# Patient Record
Sex: FEMALE | URBAN - METROPOLITAN AREA
[De-identification: names, ages, dates, MRNs, and addresses within clinical notes are randomized per-mention and may not be internally consistent; named-entity substitution may affect disease eponyms.]

---

## 2022-08-04 ENCOUNTER — APPOINTMENT (OUTPATIENT)
Dept: URBAN - METROPOLITAN AREA CLINIC 265 | Age: 32
Setting detail: DERMATOLOGY
End: 2022-08-05

## 2022-08-04 VITALS — HEIGHT: 55 IN | WEIGHT: 220 LBS

## 2022-08-04 DIAGNOSIS — D17 BENIGN LIPOMATOUS NEOPLASM: ICD-10-CM

## 2022-08-04 DIAGNOSIS — L74.51 PRIMARY FOCAL HYPERHIDROSIS: ICD-10-CM

## 2022-08-04 PROBLEM — L74.513 PRIMARY FOCAL HYPERHIDROSIS, SOLES: Status: ACTIVE | Noted: 2022-08-04

## 2022-08-04 PROBLEM — D17.22 BENIGN LIPOMATOUS NEOPLASM OF SKIN AND SUBCUTANEOUS TISSUE OF LEFT ARM: Status: ACTIVE | Noted: 2022-08-04

## 2022-08-04 PROBLEM — L74.519 PRIMARY FOCAL HYPERHIDROSIS, UNSPECIFIED: Status: ACTIVE | Noted: 2022-08-04

## 2022-08-04 PROBLEM — L74.512 PRIMARY FOCAL HYPERHIDROSIS, PALMS: Status: ACTIVE | Noted: 2022-08-04

## 2022-08-04 PROBLEM — D17.1 BENIGN LIPOMATOUS NEOPLASM OF SKIN AND SUBCUTANEOUS TISSUE OF TRUNK: Status: ACTIVE | Noted: 2022-08-04

## 2022-08-04 PROBLEM — L74.510 PRIMARY FOCAL HYPERHIDROSIS, AXILLA: Status: ACTIVE | Noted: 2022-08-04

## 2022-08-04 PROCEDURE — OTHER COUNSELING: OTHER

## 2022-08-04 PROCEDURE — 99203 OFFICE O/P NEW LOW 30 MIN: CPT

## 2022-08-04 PROCEDURE — OTHER MIPS QUALITY: OTHER

## 2022-08-04 PROCEDURE — OTHER PRESCRIPTION: OTHER

## 2022-08-04 PROCEDURE — OTHER PRESCRIPTION MEDICATION MANAGEMENT: OTHER

## 2022-08-04 PROCEDURE — OTHER ADDITIONAL NOTES: OTHER

## 2022-08-04 PROCEDURE — OTHER MEDICATION COUNSELING: OTHER

## 2022-08-04 RX ORDER — GLYCOPYRROLATE 1 MG/1
TABLET ORAL
Qty: 30 | Refills: 3 | Status: ERX | COMMUNITY
Start: 2022-08-04

## 2022-08-04 ASSESSMENT — LOCATION DETAILED DESCRIPTION DERM
LOCATION DETAILED: LEFT ANTERIOR DISTAL UPPER ARM
LOCATION DETAILED: RIGHT ANTERIOR AXILLA
LOCATION DETAILED: LEFT DORSAL FOOT
LOCATION DETAILED: RIGHT RADIAL PALM
LOCATION DETAILED: LEFT SUPERIOR LATERAL MIDBACK
LOCATION DETAILED: LEFT INFRAMAMMARY CREASE (INNER QUADRANT)
LOCATION DETAILED: RIGHT RIB CAGE
LOCATION DETAILED: LEFT RADIAL PALM
LOCATION DETAILED: RIGHT DORSAL FOOT
LOCATION DETAILED: RIGHT INFERIOR LATERAL MIDBACK
LOCATION DETAILED: LEFT ANTERIOR AXILLA
LOCATION DETAILED: LEFT PROXIMAL DORSAL FOREARM
LOCATION DETAILED: LEFT ANTERIOR PROXIMAL THIGH
LOCATION DETAILED: RIGHT ANTERIOR PROXIMAL THIGH

## 2022-08-04 ASSESSMENT — LOCATION SIMPLE DESCRIPTION DERM
LOCATION SIMPLE: RIGHT FOOT
LOCATION SIMPLE: RIGHT THIGH
LOCATION SIMPLE: LEFT AXILLA
LOCATION SIMPLE: ABDOMEN
LOCATION SIMPLE: LEFT BREAST
LOCATION SIMPLE: RIGHT HAND
LOCATION SIMPLE: RIGHT LOWER BACK
LOCATION SIMPLE: RIGHT AXILLA
LOCATION SIMPLE: LEFT UPPER ARM
LOCATION SIMPLE: LEFT HAND
LOCATION SIMPLE: LEFT THIGH
LOCATION SIMPLE: LEFT FOOT
LOCATION SIMPLE: LEFT FOREARM
LOCATION SIMPLE: LEFT LOWER BACK

## 2022-08-04 ASSESSMENT — LOCATION ZONE DERM
LOCATION ZONE: TRUNK
LOCATION ZONE: ARM
LOCATION ZONE: FEET
LOCATION ZONE: HAND
LOCATION ZONE: AXILLAE
LOCATION ZONE: LEG

## 2022-08-04 NOTE — PROCEDURE: ADDITIONAL NOTES
Render Risk Assessment In Note?: no
Additional Notes: Discussed referral to general surgery for evaluation and treatment of nodules due to depth and size of sites.
Detail Level: Zone

## 2022-08-04 NOTE — PROCEDURE: PRESCRIPTION MEDICATION MANAGEMENT
Initiate Treatment: Glycopyrolate 1mg- Take one tablet by mouth daily
Detail Level: Zone
Render In Strict Bullet Format?: No
Plan: Recommended seeing OBGYN for work up on hormones to rule out any underlying issues.

## 2022-08-04 NOTE — PROCEDURE: MEDICATION COUNSELING

## 2022-08-04 NOTE — PROCEDURE: COUNSELING
Detail Level: Simple
Medical Necessity Clause: Botulinum toxin hyperhidrosis therapy is medically necessary because
Detail Level: Detailed
Medical Necessity Information: LCD Guidelines vary from payer to payer. Please check with your payer's policy to determine medical necessity.

## 2024-08-01 NOTE — PROCEDURE: MEDICATION COUNSELING
Tommy Gupta Jr. is calling to request a refill on the following medication(s):    Medication Request:  Requested Prescriptions     Pending Prescriptions Disp Refills    ALPRAZolam (XANAX) 0.5 MG tablet [Pharmacy Med Name: ALPRAZOLAM 0.5 MG TABLET] 90 tablet      Sig: TAKE 1 TABLET BY MOUTH 3 TIMES A DAY AS NEEDED FOR ANXIETY       Last Visit Date (If Applicable):  Visit date not found    Next Visit Date:    Visit date not found               Propranolol Counseling:  I discussed with the patient the risks of propranolol including but not limited to low heart rate, low blood pressure, low blood sugar, restlessness and increased cold sensitivity. They should call the office if they experience any of these side effects.